# Patient Record
Sex: MALE | Race: WHITE | NOT HISPANIC OR LATINO | Employment: OTHER | URBAN - METROPOLITAN AREA
[De-identification: names, ages, dates, MRNs, and addresses within clinical notes are randomized per-mention and may not be internally consistent; named-entity substitution may affect disease eponyms.]

---

## 2018-07-30 DIAGNOSIS — I25.10 CORONARY ARTERY DISEASE, ANGINA PRESENCE UNSPECIFIED, UNSPECIFIED VESSEL OR LESION TYPE, UNSPECIFIED WHETHER NATIVE OR TRANSPLANTED HEART: Primary | ICD-10-CM

## 2018-09-05 DIAGNOSIS — I25.10 CORONARY ARTERY DISEASE, ANGINA PRESENCE UNSPECIFIED, UNSPECIFIED VESSEL OR LESION TYPE, UNSPECIFIED WHETHER NATIVE OR TRANSPLANTED HEART: Primary | ICD-10-CM

## 2018-10-29 ENCOUNTER — HOSPITAL ENCOUNTER (OUTPATIENT)
Dept: CARDIOLOGY | Facility: CLINIC | Age: 78
Discharge: HOME | End: 2018-10-29
Attending: INTERNAL MEDICINE
Payer: COMMERCIAL

## 2018-10-29 ENCOUNTER — OFFICE VISIT (OUTPATIENT)
Dept: CARDIOLOGY | Facility: CLINIC | Age: 78
End: 2018-10-29
Payer: COMMERCIAL

## 2018-10-29 ENCOUNTER — DOCUMENTATION ONLY (OUTPATIENT)
Dept: CARDIOLOGY | Facility: CLINIC | Age: 78
End: 2018-10-29

## 2018-10-29 VITALS
HEIGHT: 72 IN | HEART RATE: 75 BPM | DIASTOLIC BLOOD PRESSURE: 76 MMHG | WEIGHT: 194.44 LBS | BODY MASS INDEX: 26.34 KG/M2 | SYSTOLIC BLOOD PRESSURE: 134 MMHG | OXYGEN SATURATION: 99 %

## 2018-10-29 DIAGNOSIS — I25.10 CORONARY ARTERY DISEASE, ANGINA PRESENCE UNSPECIFIED, UNSPECIFIED VESSEL OR LESION TYPE, UNSPECIFIED WHETHER NATIVE OR TRANSPLANTED HEART: ICD-10-CM

## 2018-10-29 DIAGNOSIS — I10 ESSENTIAL HYPERTENSION: ICD-10-CM

## 2018-10-29 DIAGNOSIS — E78.2 MIXED HYPERLIPIDEMIA: ICD-10-CM

## 2018-10-29 DIAGNOSIS — I25.10 CORONARY ARTERY DISEASE, ANGINA PRESENCE UNSPECIFIED, UNSPECIFIED VESSEL OR LESION TYPE, UNSPECIFIED WHETHER NATIVE OR TRANSPLANTED HEART: Primary | ICD-10-CM

## 2018-10-29 LAB
ESTIMATED PA SYSTOLIC PRESSURE: 23.61
GLOBAL PERICARDIAL EFFUSION: NORMAL
RETIRED EF AND QEF - SEE NOTES: 60 (ref 55–65)

## 2018-10-29 PROCEDURE — 3075F SYST BP GE 130 - 139MM HG: CPT | Mod: CPTII,S$GLB,, | Performed by: INTERNAL MEDICINE

## 2018-10-29 PROCEDURE — 3078F DIAST BP <80 MM HG: CPT | Mod: CPTII,S$GLB,, | Performed by: INTERNAL MEDICINE

## 2018-10-29 PROCEDURE — 93325 DOPPLER ECHO COLOR FLOW MAPG: CPT | Mod: S$GLB,,, | Performed by: INTERNAL MEDICINE

## 2018-10-29 PROCEDURE — 99212 OFFICE O/P EST SF 10 MIN: CPT | Mod: S$GLB,,, | Performed by: INTERNAL MEDICINE

## 2018-10-29 PROCEDURE — 99999 PR PBB SHADOW E&M-EST. PATIENT-LVL III: CPT | Mod: PBBFAC,,, | Performed by: INTERNAL MEDICINE

## 2018-10-29 PROCEDURE — 93320 DOPPLER ECHO COMPLETE: CPT | Mod: S$GLB,,, | Performed by: INTERNAL MEDICINE

## 2018-10-29 PROCEDURE — 93351 STRESS TTE COMPLETE: CPT | Mod: S$GLB,,, | Performed by: INTERNAL MEDICINE

## 2018-10-29 RX ORDER — SUCRALFATE 1 G/1
1 TABLET ORAL 4 TIMES DAILY
COMMUNITY

## 2018-10-29 NOTE — PROGRESS NOTES
Patient verified by 2 identifiers and allergies reviewed.  22g IV placed to Rt AC.  DSE explained to patient, consent obtained & testing completed.  Pt tolerated testing well. IV removed post testing.  Post study discharge instructions reviewed with patient, patient verbalized understanding.  Patient discharged to home in stable condition.

## 2018-10-29 NOTE — PROGRESS NOTES
Cardiology Clinic Note  Reason for Visit: Coronary Artery Disease     Primary Cardiologist- Changing to Dr Favio Hong([facility], [place], [place] Fax [phone])    HPI:   Mr. Chavez Dee is a 77 y.o. gentleman with history of HLD, and CAD s/p PCI in June 2006 by Dr. Medellin with Taxus LEAH to mid LAD and OM1. He has been following here every 2 years with stress test. He denies any chest pain or shortness of breath.He was working with a  when he was seen last time but for past 7-8 months he has limited his physical activity due to Sciatic pain in RLE which gets better after he wakes up and walks around.He denies any claudication symptoms. He is still Golfing and bowling and denies any issues.     He is switching cardiologist to Dr Knapp and would like to get his stress test with him as he is local.      ROS:    Review of Systems   Constitution: Negative for decreased appetite and fever.   HENT: Negative for hoarse voice and nosebleeds.    Eyes: Negative for blurred vision and photophobia.   Cardiovascular: Negative for chest pain, dyspnea on exertion, irregular heartbeat, orthopnea and palpitations.   Respiratory: Negative for cough and shortness of breath.    Hematologic/Lymphatic: Negative for bleeding problem. Does not bruise/bleed easily.   Skin: Negative for itching and rash.   Musculoskeletal: Negative for joint swelling and neck pain.   Gastrointestinal: Negative for abdominal pain, hematemesis, hematochezia, nausea and vomiting.   Genitourinary: Negative for hematuria.   Neurological: Negative for light-headedness and seizures.   Psychiatric/Behavioral: Negative for altered mental status. The patient is not nervous/anxious.         PMH:     Past Medical History:   Diagnosis Date    BPH (benign prostatic hyperplasia)     CAD (coronary artery disease) 2006    s/p PCI LEAH.    Hyperlipidemia     Hypertension     Hypothyroidism     Thyroid cancer      s/p total thyroidectomy at an early stage     Past Surgical History:   Procedure Laterality Date    CARDIAC CATHETERIZATION  2006    PCI (LEAH) to LAD and LCx.    CHOLECYSTECTOMY  1985    CORONARY ANGIOPLASTY      TOTAL THYROIDECTOMY  2005    for early thyroid cancer.     Allergies:   Review of patient's allergies indicates:  No Known Allergies  Medications:     Current Outpatient Medications on File Prior to Visit   Medication Sig Dispense Refill    acetaminophen (TYLENOL) 500 MG tablet Take 1,000 mg by mouth once daily.      aspirin (ECOTRIN) 81 MG EC tablet Take 81 mg by mouth once daily.      atorvastatin (LIPITOR) 40 MG tablet Take 40 mg by mouth once daily.      dutasteride (AVODART) 0.5 mg capsule Take by mouth. 1 Capsule Oral Every day      LEVOTHYROXINE SODIUM (SYNTHROID ORAL) Take 137 mcg by mouth once daily. Pt is taking 0.05mg and 0.112mg  Once a day      lisinopril (PRINIVIL,ZESTRIL) 20 MG tablet Take 20 mg by mouth once daily.      multivitamin (THERAGRAN) per tablet Take by mouth. 1 Tablet Oral Every day      pantoprazole (PROTONIX) 40 MG tablet Take 40 mg by mouth 2 (two) times daily.       ranitidine (ZANTAC) 300 MG tablet TK 1 T PO D FOR 10 DAYS  WITH THE MILANA MEAL  5    sucralfate (CARAFATE) 1 gram tablet Take 1 g by mouth 4 (four) times daily.      [DISCONTINUED] UNABLE TO FIND Take 1 tablet by mouth every evening. medication name: Patient unsure of name.  It is a new medicine started after his colonoscopy.       No current facility-administered medications on file prior to visit.      Social History:     Social History     Tobacco Use    Smoking status: Never Smoker    Smokeless tobacco: Never Used   Substance Use Topics    Alcohol use: Yes     Alcohol/week: 0.6 oz     Types: 1 Shots of liquor per week     Comment: 2 drinks / year.     Family History:     Family History   Problem Relation Age of Onset    Hypertension Mother     Hypertension Brother     Heart disease  Paternal Grandmother     Heart failure Paternal Grandmother     Heart attack Paternal Grandmother     No Known Problems Father     No Known Problems Sister     No Known Problems Maternal Grandmother     No Known Problems Maternal Grandfather     No Known Problems Paternal Grandfather     No Known Problems Maternal Aunt     No Known Problems Maternal Uncle     No Known Problems Paternal Aunt     No Known Problems Paternal Uncle     Anemia Neg Hx     Arrhythmia Neg Hx     Asthma Neg Hx     Clotting disorder Neg Hx     Fainting Neg Hx     Hyperlipidemia Neg Hx     Stroke Neg Hx     Atrial Septal Defect Neg Hx      Physical Exam:   /76 (BP Location: Left arm, Patient Position: Sitting, BP Method: Large (Automatic))   Pulse 75   Ht 6' (1.829 m)   Wt 88.2 kg (194 lb 7.1 oz)   SpO2 99%   BMI 26.37 kg/m²      Physical Exam  General: alert, awake and oriented x 3  Eyes:PERRL.   Neck:no JVD   Lungs:  clear to auscultation bilaterally   Cardiovascular: Heart: regular rate and rhythm, S1, S2 normal, no murmur, click, rub or gallop.   Chest Wall: no tenderness.   Pulses-2+ radial, DP, PT b/l  Extremities: no cyanosis or edema.   Abdomen/Rectal: Abdomen: soft, non-tender non-distented; bowel sounds normal  Neurologic: Normal strength and tone. No focal numbness or weakness  Labs:     No results found for: NA, K, CL, CO2, BUN, CREATININE, GLUCOSE, ANIONGAP  No results found for: HGBA1C  No results found for: BNP, BNPTRIAGEBLO No results found for: WBC, HGB, HCT, PLT, GRAN  No results found for: CHOL, HDL, LDLCALC, TRIG       Imaging:   CONCLUSIONS     1 - Moderate left atrial enlargement.     2 - Normal left ventricular systolic function (EF 60-65%).     3 - No wall motion abnormalities.     4 - Indeterminate LV diastolic function.     5 - Normal right ventricular systolic function .     6 - Trivial pericardial effusion.     No evidence of stress induced myocardial ischemia.  Assessment:     1.  Coronary artery disease, without angina, mLAD and OM1 stent 2006  Negative dobutamine stress echo 10/29/2018 peak HR 89 % of age predicted max   2. Essential hypertension continue ACE-I, well controlled   3. Mixed hyperlipidemia continue statin       Plan:     -Continue aspirin and statin  -Patient would like to get stress test with Dr Hong from now on as he is local and come here if he needs angiogram/angioplasty  -RTC as needed      ALYCE MORELOS  CARDIOLOGY FELLOW, PGY 5  [phone]    Staff:  I have personally taken the history and examined this patient and agree with the fellow's note as stated above and amended it accordingly :-) [place]